# Patient Record
Sex: MALE | Race: WHITE | NOT HISPANIC OR LATINO
[De-identification: names, ages, dates, MRNs, and addresses within clinical notes are randomized per-mention and may not be internally consistent; named-entity substitution may affect disease eponyms.]

---

## 2022-08-02 PROBLEM — Z00.00 ENCOUNTER FOR PREVENTIVE HEALTH EXAMINATION: Status: ACTIVE | Noted: 2022-08-02

## 2022-08-16 ENCOUNTER — APPOINTMENT (OUTPATIENT)
Dept: NEUROSURGERY | Facility: CLINIC | Age: 62
End: 2022-08-16

## 2022-08-16 DIAGNOSIS — D32.9 BENIGN NEOPLASM OF MENINGES, UNSPECIFIED: ICD-10-CM

## 2022-08-16 PROCEDURE — 99205 OFFICE O/P NEW HI 60 MIN: CPT

## 2022-08-16 NOTE — ASSESSMENT
[FreeTextEntry1] : Mr. Kaye presents with a MRI brain from 6/17/22 reviewed independently by me which demonstrates no interval growth of small, approximately 6mm right parasagittal meningioma at the vertex without evidence for mass effect or subjacent vasogenic edema, as well as no interval growth of presumed arachnoid cyst when compared directly with prior imaging from 5/5/21. Of note, remote MRI 3/10/2012 did not demonstrate the meningioma, though it did demonstrate the arachnoid cyst which has remained stable over 10 years.  \par \par Natural history and alternative management strategies discussed. There is no indication for neurosurgical intervention at this time. Given the stability of this small, incidental meningioma over one year, I have recommended surveillance MRI brain with and without gadolinium in one year with an appointment to follow. The arachnoid cyst has remained stable over 10 years and therefore does not require surveillance, though it will be imaged along with surveillance MRI for the meningioma. \par \par Of note, I did discuss the possibility of diagnostic angiography to evaluate for venous outflow obstruction as a potential etiology for the patient's tinnitus. The tinnitus is not particularly bothersome and does not affect his quality of life, and therefore he wishes to defer this investigation at this time. He understands that if the tinnitus becomes bothersome or progresses, diagnostic cerebral angiography may be performed to evaluate for potentially reversible etiology.\par \par I have asked the patient to contact me for any symptomatic development or progression in the interim at which time we can obtain expedited follow up imaging.\par \par A total of 60 minutes were spent relative to this encounter.\par \par \par \par \par \par \par \par

## 2022-08-16 NOTE — DATA REVIEWED
[de-identified] : MRI BRAIN WITH AND WITHOUT CONTRAST: 6/17/22: IMPRESSION- 1. Stable 6 mm enhancing extra-axial focus at the parasagittal right convexity \par in keeping with meningioma. \par 2. Mild chronic microvascular ischemic changes, similar to prior exam, without \par acute infarct. \par 3. Scattered tiny gradient hypointensities, similar to prior exam, favored \par hemosiderin staining related to chronic microhemorrhage. \par 4. Stable right frontal arachnoid cyst.  [de-identified] : MRI/A BRAIN 5/5/21: he ventricles are normal in size, shape and position. There are scattered small \par foci of T2 prolongation in the supratentorial white matter, similar to prior \par exam and suggesting mild chronic microvascular ischemic changes. Gradient-echo \par images demonstrate few scattered bilateral foci of signal void in the \par supratentorial compartment, largest of which measures 4 mm in the right frontal \par lobe on image 19, suggesting foci of microhemorrhage versus calcifications, new \par compared to prior exam. Diffusion weighted images demonstrate no evidence of \par acute infarction. Increased signal of the pituitary gland on sagittal FLAIR \par sequence is likely artifactual. There is a stable right frontal convexity \par arachnoid cyst.  \par   \par Postcontrast images demonstrate 6 mm enhancing extra-axial lesion at the right \par parasagittal vertex on coronal image 16, suspicious for small meningioma and new \par compared to prior exam. No associated mass effect or adjacent vasogenic edema. \par   \par Thin section images demonstrate no evidence of mass within either \par cerebello-pontine angle or internal auditory canal. The inner ear structures are \par grossly symmetric.  \par   \par There is no evidence of significant sinusitis. \par   \par MRI BRAIN IMPRESSION: \par   \par Subcentimeter right parasagittal vertex meningioma. Follow-up MRI recommended at \par a clinically appropriate interval. \par   \par Few small signal voids in the supratentorial compartment on gradient echo \par images, nonspecific but may represent foci of microhemorrhage (as can be seen \par with hypertension or amyloid angiopathy) or nonspecific calcifications. Clinical \par correlation and attention on follow-up suggested. \par   \par Mild chronic white matter ischemic changes, not significantly changed compared \par to prior study. No evidence of acute infarction. \par   \par No evidence of vestibular schwannoma. \par   \par   \par   \par MRA Head: \par   \par Technique: Noncontrast MRA of the head was performed on a 3 Petra magnet \par utilizing 3-D time of flight technique. Post processed 3D rotating MIP images \par were also obtained. \par   \par Comparison/correlation: 2/14/2012 \par   \par Findings: \par   \par The petrous, cavernous and supraclinoid segments of the internal carotid \par arteries are patent. The vertebro-basilar system, as well as the anterior, \par middle and posterior cerebral arteries are patent. Left vertebral artery is \par dominant. Stable mildly prominent right lenticulostriate arteries. There is no \par significant stenosis or aneurysm in the anterior or posterior circulations. \par   \par MRA HEAD IMPRESSION: \par   \par Unremarkable noncontrast MRA of the head. \par   \par   \par   \par MRA Neck: \par   \par Technique: Noncontrast MRA of the neck was performed on a 3[employer]esla magnet. The \par study was acquired in the axial plane utilizing 2 D time of flight technique. \par 3-D rotating MIP images were also obtained. \par   \par Findings: \par   \par Note is made that contrast enhanced MRA is a more sensitive technique in \par evaluating the arteries of the neck. In addition the study is limited by motion \par artifact. The cervical segments of the right and left vertebral arteries are \par patent, without evidence of stenosis.  \par   \par Common carotid arteries are patent and normal in caliber. The internal carotid \par arteries are patent and without evidence of hemodynamically significant \par stenosis. Note is made that flow-related artifact somewhat limits evaluation of \par the origin of the right internal carotid artery and evaluation of the origin of \par the left internal carotid artery somewhat limited by motion artifact. \par   \par MRA NECK IMPRESSION: \par   \par No gross evidence of hemodynamically significant stenosis. Limitations of the \par exam described above.

## 2022-08-16 NOTE — HISTORY OF PRESENT ILLNESS
[de-identified] : Mr. Kaye presents in neurosurgical consultation at the request of Dr. Maritza Parson. She has a PMHx of mitral valve replacement 1/2020, fatty liver, BPH, and HTN. One year ago patient reported dizziness with gait imbalance and bilateral nonpulsatile tinnitus associated with bitemporal pressure. Imbalance worsens with exercise. Further evaluation directed by otolaryngologist, Dr. Bella. Audiogram in 2021 within normal limits. Imaging revealed findings most consistent with a small right parasagittal meningoma at the vertex and a right frontal convexity arachnoid cyst without significant mass effect. Interval surveillance performed by Dr. Parson. Follow up MRI brain detailed below.\par \par Today the patient continues to experience intermittent dizziness, bitemporal headaches, and gait imbalance which occur during exercise. Patient participated in vestibular therapy last year with symptomatic improvement. Bilateral tinnitus remains stable.  Denies other neurological symptoms.

## 2022-08-17 PROBLEM — D32.9 MENINGIOMA: Status: ACTIVE | Noted: 2022-08-17

## 2023-07-21 ENCOUNTER — NON-APPOINTMENT (OUTPATIENT)
Age: 63
End: 2023-07-21

## 2023-07-31 ENCOUNTER — APPOINTMENT (OUTPATIENT)
Dept: NEUROSURGERY | Facility: CLINIC | Age: 63
End: 2023-07-31
Payer: COMMERCIAL

## 2023-07-31 ENCOUNTER — TRANSCRIPTION ENCOUNTER (OUTPATIENT)
Age: 63
End: 2023-07-31

## 2023-07-31 PROCEDURE — 99215 OFFICE O/P EST HI 40 MIN: CPT

## 2023-07-31 NOTE — DATA REVIEWED
[de-identified] : MRI BRAIN WITH AND WITHOUT CONTRAST: 6/20/23: IMPRESSION- Stable 7 mm right parasagittal vertex meningioma. \par   \par Stable mild chronic white matter ischemic changes. No evidence of acute \par infarction. \par   \par Scattered small gradient echo signal hypointensities, not significantly changed \par compared to prior exam and may represent foci of microhemorrhage (as can be seen \par with hypertension or cerebral amyloid angiopathy), correlate clinically. \par There is stable right frontal convexity arachnoid cyst.

## 2023-07-31 NOTE — ASSESSMENT
[FreeTextEntry1] : Mr. Kaye presents with MRI brain from 6/20/23 reviewed independently by me which demonstrates no interval growth of small, approximately 6mm right parasagittal meningioma at the vertex without evidence for mass effect or subjacent vasogenic edema, as well as no interval growth of presumed arachnoid cyst when compared directly with prior imaging from 6/17/22.   Natural history and alternative management strategies discussed. There is no indication for neurosurgical intervention at this time. Given the stability of this small, incidental meningioma over one year, I have recommended surveillance MRI brain with and without gadolinium in 1 year with an appointment to follow.   Once again discussed the possibility of diagnostic angiography to evaluate for venous outflow obstruction as a potential etiology for the patient's tinnitus. The tinnitus is nonpulsatile in nature, not particularly bothersome, and does not affect his quality of life, and therefore he wishes to defer this investigation at this time. He understands that if the tinnitus becomes bothersome or progresses, diagnostic cerebral angiography may be performed to evaluate for potentially reversible etiology. He wishes to defer for now and proceed with annual surveillance imaging.   I have asked the patient to contact me for any symptomatic development or progression in the interim at which time we can obtain expedited follow up imaging.  A total of 45 minutes were spent relative to this encounter.

## 2023-07-31 NOTE — HISTORY OF PRESENT ILLNESS
[FreeTextEntry1] : Ms. Kaye presents in neurosurgical follow up. Previous notes and interval history. Patient reports non bothersome bilateral non pulsatile tinnitus has remained stable in frequency and severity.  Denies new neurological symptoms. Surveillance MRI detailed below.

## 2024-08-05 NOTE — DATA REVIEWED
[de-identified] : MRI Brain with and without gadolinium 7/9/24: Impression: Stable subcentimerter meningioma.

## 2024-08-05 NOTE — PHYSICAL EXAM
[General Appearance - Alert] : alert [General Appearance - In No Acute Distress] : in no acute distress [Oriented To Time, Place, And Person] : oriented to person, place, and time [Cranial Nerves Optic (II)] : visual acuity intact bilaterally,  pupils equal round and reactive to light [Cranial Nerves Trigeminal (V)] : facial sensation intact symmetrically [Cranial Nerves Oculomotor (III)] : extraocular motion intact [Cranial Nerves Facial (VII)] : face symmetrical [Cranial Nerves Vestibulocochlear (VIII)] : hearing was intact bilaterally [Cranial Nerves Glossopharyngeal (IX)] : tongue and palate midline [Cranial Nerves Accessory (XI - Cranial And Spinal)] : head turning and shoulder shrug symmetric [Cranial Nerves Hypoglossal (XII)] : there was no tongue deviation with protrusion [Motor Tone] : muscle tone was normal in all four extremities [Motor Strength] : muscle strength was normal in all four extremities [Abnormal Walk] : normal gait [Balance] : balance was intact [PERRL With Normal Accommodation] : pupils were equal in size, round, reactive to light, with normal accommodation [Extraocular Movements] : extraocular movements were intact [Full Visual Field] : full visual field [Coordination - Dysmetria Impaired Finger-to-Nose Bilateral] : not present

## 2024-08-05 NOTE — ASSESSMENT
[FreeTextEntry1] : DANDY PATINO is a 64 year male who presents for annual follow up. I personally reviewed and interpreted his latest MRI Brain with and without gadolinium from 7/9/24 no interval growth of small, approximately 6mm right parasagittal meningioma at the vertex without evidence for mass effect or subjacent vasogenic edema, as well as no interval growth of presumed arachnoid cyst when compared directly with prior imaging from 6/17/22 and 6/20/23.   I have recommended MRI brain with and without gadolinium to include a frameless protocol in 1 year with an appointment to follow.  Natural history and alternative management strategies discussed. There is no indication for neurosurgical intervention at this time. Given the stability of this small, incidental meningioma over one year, I have recommended surveillance MRI brain with and without gadolinium in 1 year with an appointment to follow.  ?????Once again discussed the possibility of diagnostic angiography to evaluate for venous outflow obstruction as a potential etiology for the patient's tinnitus. The tinnitus is nonpulsatile in nature, not particularly bothersome, and does not affect his quality of life, and therefore he wishes to defer this investigation at this time. He understands that if the tinnitus becomes bothersome or progresses, diagnostic cerebral angiography may be performed to evaluate for potentially reversible etiology. He wishes to defer for now and proceed with annual surveillance imaging.  I have asked the patient to contact me for any symptomatic development or progression in the interim at which time we can obtain expedited follow up imaging.  A total of 45 minutes were spent relative to this encounter.

## 2024-08-05 NOTE — DATA REVIEWED
[de-identified] : MRI Brain with and without gadolinium 7/9/24: Impression: Stable subcentimerter meningioma.

## 2024-08-05 NOTE — PHYSICAL EXAM
[General Appearance - Alert] : alert [General Appearance - In No Acute Distress] : in no acute distress [Oriented To Time, Place, And Person] : oriented to person, place, and time [Cranial Nerves Optic (II)] : visual acuity intact bilaterally,  pupils equal round and reactive to light [Cranial Nerves Oculomotor (III)] : extraocular motion intact [Cranial Nerves Trigeminal (V)] : facial sensation intact symmetrically [Cranial Nerves Facial (VII)] : face symmetrical [Cranial Nerves Vestibulocochlear (VIII)] : hearing was intact bilaterally [Cranial Nerves Glossopharyngeal (IX)] : tongue and palate midline [Cranial Nerves Accessory (XI - Cranial And Spinal)] : head turning and shoulder shrug symmetric [Cranial Nerves Hypoglossal (XII)] : there was no tongue deviation with protrusion [Motor Tone] : muscle tone was normal in all four extremities [Motor Strength] : muscle strength was normal in all four extremities [Abnormal Walk] : normal gait [Balance] : balance was intact [PERRL With Normal Accommodation] : pupils were equal in size, round, reactive to light, with normal accommodation [Extraocular Movements] : extraocular movements were intact [Full Visual Field] : full visual field [Coordination - Dysmetria Impaired Finger-to-Nose Bilateral] : not present

## 2024-08-05 NOTE — HISTORY OF PRESENT ILLNESS
[FreeTextEntry1] :  DANDY PATINO is a 64 year male seen today, with his *** in attendance, in neurosurgery follow up and for imaging review.  Previous notes and interval history reviewed. He continues to ???? report non bothersome bilateral non pulsatile tinnitus has remained stable in frequency and severity. Denies new neurological symptoms. He has undergone updated MRI  with and without gadolinium on  7/9/24 which I have independently reviewed and detailed below.

## 2024-08-05 NOTE — DATA REVIEWED
[de-identified] : MRI Brain with and without gadolinium 7/9/24: Impression: Stable subcentimerter meningioma.

## 2024-08-15 ENCOUNTER — APPOINTMENT (OUTPATIENT)
Dept: NEUROSURGERY | Facility: CLINIC | Age: 64
End: 2024-08-15
Payer: COMMERCIAL

## 2024-08-15 PROCEDURE — 99215 OFFICE O/P EST HI 40 MIN: CPT

## 2024-08-15 PROCEDURE — G2211 COMPLEX E/M VISIT ADD ON: CPT | Mod: NC

## 2025-07-15 ENCOUNTER — APPOINTMENT (OUTPATIENT)
Dept: NEUROSURGERY | Facility: CLINIC | Age: 65
End: 2025-07-15
Payer: COMMERCIAL

## 2025-07-15 PROCEDURE — G2211 COMPLEX E/M VISIT ADD ON: CPT | Mod: NC

## 2025-07-15 PROCEDURE — 99215 OFFICE O/P EST HI 40 MIN: CPT

## 2025-07-18 ENCOUNTER — NON-APPOINTMENT (OUTPATIENT)
Age: 65
End: 2025-07-18